# Patient Record
(demographics unavailable — no encounter records)

---

## 2025-01-30 NOTE — HISTORY OF PRESENT ILLNESS
[Parents] : parents [Vegetables] : vegetables [Meat] : meat [Grains] : grains [Eggs] : eggs [Dairy] : dairy [Normal] : Normal [Sippy cup use] : Sippy cup use [Brushing teeth] : Brushing teeth [Yes] : Patient goes to dentist yearly [Toothpaste] : Primary Fluoride Source: Toothpaste [Playtime (60 min/d)] : Playtime 60 min a day [Up to date] : Up to date [NO] : No [FreeTextEntry7] : 3yr old well child here for her annual [de-identified] : reporting eye rubbing, nose itching and general itchiness observed with her and broken out with hives w/ dog dander  [FreeTextEntry8] : jorden trained but needs help wiping  [FreeTextEntry9] : homeschooled also  [FreeTextEntry1] : 3yr old well child h/o suspected AR/eczema like symptoms

## 2025-01-30 NOTE — DISCUSSION/SUMMARY
[Normal Growth] : growth [Normal Development] : development [None] : No known medical problems [No Elimination Concerns] : elimination [No Feeding Concerns] : feeding [No Skin Concerns] : skin [Normal Sleep Pattern] : sleep [No Medications] : ~He/She~ is not on any medications [Parent/Guardian] : parent/guardian [Family Support] : family support [Encouraging Literacy Activities] : encouraging literacy activities [Playing with Peers] : playing with peers [Promoting Physical Activity] : promoting physical activity [Safety] : safety [FreeTextEntry1] : 3yr old toddler, well no major issues suspect AR related symptoms including eczematous skin and possible pollen allergy in spring recommended OTC zyrtec 5ml/5mg daily use and consider saline nasal rinse, wiping face/shower when indoors if spring allergies is bad, avoid dander exposure if possible

## 2025-01-30 NOTE — PHYSICAL EXAM

## 2025-01-30 NOTE — REVIEW OF SYSTEMS
[Negative] : Genitourinary [Dry Skin] : dry skin [Itching] : itching [Eye Discharge] : no eye discharge [Nasal Discharge] : no nasal discharge [Nasal Congestion] : no nasal congestion